# Patient Record
Sex: FEMALE | Race: AMERICAN INDIAN OR ALASKA NATIVE
[De-identification: names, ages, dates, MRNs, and addresses within clinical notes are randomized per-mention and may not be internally consistent; named-entity substitution may affect disease eponyms.]

---

## 2019-10-17 ENCOUNTER — HOSPITAL ENCOUNTER (OUTPATIENT)
Dept: HOSPITAL 5 - TRG | Age: 29
Discharge: HOME | End: 2019-10-17
Attending: OBSTETRICS & GYNECOLOGY
Payer: COMMERCIAL

## 2019-10-17 VITALS — DIASTOLIC BLOOD PRESSURE: 69 MMHG | SYSTOLIC BLOOD PRESSURE: 116 MMHG

## 2019-10-17 DIAGNOSIS — O47.02: ICD-10-CM

## 2019-10-17 DIAGNOSIS — Z3A.24: ICD-10-CM

## 2019-10-17 DIAGNOSIS — O26.892: Primary | ICD-10-CM

## 2019-10-17 DIAGNOSIS — R25.2: ICD-10-CM

## 2019-10-17 LAB
ALBUMIN SERPL-MCNC: 3.9 G/DL (ref 3.9–5)
ALT SERPL-CCNC: 14 UNITS/L (ref 7–56)
BACTERIA #/AREA URNS HPF: (no result) /HPF
BILIRUB UR QL STRIP: (no result)
BLOOD UR QL VISUAL: (no result)
BUN SERPL-MCNC: 7 MG/DL (ref 7–17)
BUN/CREAT SERPL: 12 %
CALCIUM SERPL-MCNC: 8.8 MG/DL (ref 8.4–10.2)
HCT VFR BLD CALC: 32.7 % (ref 30.3–42.9)
HEMOLYSIS INDEX: 7
HGB BLD-MCNC: 11.3 GM/DL (ref 10.1–14.3)
MCHC RBC AUTO-ENTMCNC: 35 % (ref 30–34)
MCV RBC AUTO: 90 FL (ref 79–97)
MUCOUS THREADS #/AREA URNS HPF: (no result) /HPF
PH UR STRIP: 7 [PH] (ref 5–7)
PLATELET # BLD: 203 K/MM3 (ref 140–440)
PROT UR STRIP-MCNC: (no result) MG/DL
RBC # BLD AUTO: 3.62 M/MM3 (ref 3.65–5.03)
RBC #/AREA URNS HPF: 2 /HPF (ref 0–6)
UROBILINOGEN UR-MCNC: < 2 MG/DL (ref ?–2)
WBC #/AREA URNS HPF: 1 /HPF (ref 0–6)

## 2019-10-17 PROCEDURE — 80053 COMPREHEN METABOLIC PANEL: CPT

## 2019-10-17 PROCEDURE — 96360 HYDRATION IV INFUSION INIT: CPT

## 2019-10-17 PROCEDURE — 36415 COLL VENOUS BLD VENIPUNCTURE: CPT

## 2019-10-17 PROCEDURE — 76819 FETAL BIOPHYS PROFIL W/O NST: CPT

## 2019-10-17 PROCEDURE — 85027 COMPLETE CBC AUTOMATED: CPT

## 2019-10-17 PROCEDURE — 59025 FETAL NON-STRESS TEST: CPT

## 2019-10-17 PROCEDURE — 81001 URINALYSIS AUTO W/SCOPE: CPT

## 2019-10-17 PROCEDURE — 76815 OB US LIMITED FETUS(S): CPT

## 2019-10-17 NOTE — ULTRASOUND REPORT
ULTRASOUND FETAL BIOPHYSICAL PROFILE

ULTRASOUND OB LIMITED 



INDICATION:  BPP/CERVICAL LENGTH



TECHNIQUE: Transabdominal ultrasound imaging.



COMPARISON:  None



FINDINGS:



Fetal breathing movement = 2

Gross body movement = 2

Fetal tone = 2

Qualitative amniotic fluid volume = 2



Total biophysical score = a/8



Amniotic fluid index was not measured.

Presentation is cephalic. 

Fetal heart rate is 147 beats per minute.

The cervix measures 3 cm.



IMPRESSION:

Fetal biophysical profile equals 8/8.

The cervix measures 3 cm.



Signer Name: Umair Menezes Jr, MD 

Signed: 10/17/2019 2:11 PM

 Workstation Name: [id number]

## 2019-10-17 NOTE — ULTRASOUND REPORT
ULTRASOUND FETAL BIOPHYSICAL PROFILE

ULTRASOUND OB LIMITED 



INDICATION:  BPP/CERVICAL LENGTH



TECHNIQUE: Transabdominal ultrasound imaging.



COMPARISON:  None



FINDINGS:



Fetal breathing movement = 2

Gross body movement = 2

Fetal tone = 2

Qualitative amniotic fluid volume = 2



Total biophysical score = a/8



Amniotic fluid index was not measured.

Presentation is cephalic. 

Fetal heart rate is 147 beats per minute.

The cervix measures 3 cm.



IMPRESSION:

Fetal biophysical profile equals 8/8.

The cervix measures 3 cm.



Signer Name: Umair Menezes Jr, MD 

Signed: 10/17/2019 2:11 PM

 Workstation Name: [id number]

## 2019-10-31 ENCOUNTER — HOSPITAL ENCOUNTER (OUTPATIENT)
Dept: HOSPITAL 5 - TRG | Age: 29
Discharge: HOME | End: 2019-10-31
Attending: OBSTETRICS & GYNECOLOGY
Payer: COMMERCIAL

## 2019-10-31 VITALS — DIASTOLIC BLOOD PRESSURE: 59 MMHG | SYSTOLIC BLOOD PRESSURE: 99 MMHG

## 2019-10-31 DIAGNOSIS — R10.30: ICD-10-CM

## 2019-10-31 DIAGNOSIS — O99.352: ICD-10-CM

## 2019-10-31 DIAGNOSIS — G43.909: ICD-10-CM

## 2019-10-31 DIAGNOSIS — Z3A.26: ICD-10-CM

## 2019-10-31 DIAGNOSIS — O26.892: Primary | ICD-10-CM

## 2019-10-31 LAB
BACTERIA #/AREA URNS HPF: (no result) /HPF
BILIRUB UR QL STRIP: (no result)
BLOOD UR QL VISUAL: (no result)
MUCOUS THREADS #/AREA URNS HPF: (no result) /HPF
PH UR STRIP: 5 [PH] (ref 5–7)
PROT UR STRIP-MCNC: (no result) MG/DL
RBC #/AREA URNS HPF: 1 /HPF (ref 0–6)
UROBILINOGEN UR-MCNC: 2 MG/DL (ref ?–2)
WBC #/AREA URNS HPF: 3 /HPF (ref 0–6)

## 2019-10-31 PROCEDURE — 96360 HYDRATION IV INFUSION INIT: CPT

## 2019-10-31 PROCEDURE — 96361 HYDRATE IV INFUSION ADD-ON: CPT

## 2019-10-31 PROCEDURE — 81001 URINALYSIS AUTO W/SCOPE: CPT

## 2019-11-08 ENCOUNTER — HOSPITAL ENCOUNTER (OUTPATIENT)
Dept: HOSPITAL 5 - TRG | Age: 29
Discharge: HOME | End: 2019-11-08
Attending: OBSTETRICS & GYNECOLOGY
Payer: COMMERCIAL

## 2019-11-08 VITALS — DIASTOLIC BLOOD PRESSURE: 76 MMHG | SYSTOLIC BLOOD PRESSURE: 121 MMHG

## 2019-11-08 DIAGNOSIS — O26.893: ICD-10-CM

## 2019-11-08 DIAGNOSIS — Z3A.28: ICD-10-CM

## 2019-11-08 LAB
BACTERIA #/AREA URNS HPF: (no result) /HPF
BILIRUB UR QL STRIP: (no result)
BLOOD UR QL VISUAL: (no result)
PH UR STRIP: 7 [PH] (ref 5–7)
PROT UR STRIP-MCNC: (no result) MG/DL
RBC #/AREA URNS HPF: 1 /HPF (ref 0–6)
UROBILINOGEN UR-MCNC: < 2 MG/DL (ref ?–2)
WBC #/AREA URNS HPF: 2 /HPF (ref 0–6)

## 2019-11-08 PROCEDURE — 59025 FETAL NON-STRESS TEST: CPT

## 2019-11-08 PROCEDURE — 36415 COLL VENOUS BLD VENIPUNCTURE: CPT

## 2019-11-08 PROCEDURE — 81001 URINALYSIS AUTO W/SCOPE: CPT

## 2019-11-08 PROCEDURE — 82731 ASSAY OF FETAL FIBRONECTIN: CPT

## 2019-11-23 ENCOUNTER — HOSPITAL ENCOUNTER (OUTPATIENT)
Dept: HOSPITAL 5 - TRG | Age: 29
Discharge: HOME | End: 2019-11-23
Attending: OBSTETRICS & GYNECOLOGY
Payer: COMMERCIAL

## 2019-11-23 VITALS — SYSTOLIC BLOOD PRESSURE: 122 MMHG | DIASTOLIC BLOOD PRESSURE: 61 MMHG

## 2019-11-23 DIAGNOSIS — D64.9: ICD-10-CM

## 2019-11-23 DIAGNOSIS — Z3A.30: ICD-10-CM

## 2019-11-23 DIAGNOSIS — O99.013: ICD-10-CM

## 2019-11-23 PROCEDURE — 96372 THER/PROPH/DIAG INJ SC/IM: CPT

## 2019-11-23 PROCEDURE — 96360 HYDRATION IV INFUSION INIT: CPT

## 2019-11-23 RX ADMIN — TERBUTALINE SULFATE SCH MG: 1 INJECTION SUBCUTANEOUS at 18:40

## 2019-11-23 RX ADMIN — TERBUTALINE SULFATE SCH MG: 1 INJECTION SUBCUTANEOUS at 19:01

## 2019-12-04 ENCOUNTER — HOSPITAL ENCOUNTER (OUTPATIENT)
Dept: HOSPITAL 5 - TRG | Age: 29
Discharge: HOME | End: 2019-12-04
Attending: OBSTETRICS & GYNECOLOGY
Payer: COMMERCIAL

## 2019-12-04 VITALS — SYSTOLIC BLOOD PRESSURE: 107 MMHG | DIASTOLIC BLOOD PRESSURE: 63 MMHG

## 2019-12-04 DIAGNOSIS — O47.03: Primary | ICD-10-CM

## 2019-12-04 DIAGNOSIS — Z3A.37: ICD-10-CM

## 2019-12-04 LAB
ALT SERPL-CCNC: 9 UNITS/L (ref 7–56)
BACTERIA #/AREA URNS HPF: (no result) /HPF
BILIRUB UR QL STRIP: (no result)
BLOOD UR QL VISUAL: (no result)
HCT VFR BLD CALC: 27.6 % (ref 30.3–42.9)
HGB BLD-MCNC: 9.5 GM/DL (ref 10.1–14.3)
MCHC RBC AUTO-ENTMCNC: 34 % (ref 30–34)
MCV RBC AUTO: 89 FL (ref 79–97)
MUCOUS THREADS #/AREA URNS HPF: (no result) /HPF
PH UR STRIP: 7 [PH] (ref 5–7)
PLATELET # BLD: 142 K/MM3 (ref 140–440)
PROT UR STRIP-MCNC: (no result) MG/DL
RBC # BLD AUTO: 3.1 M/MM3 (ref 3.65–5.03)
RBC #/AREA URNS HPF: 1 /HPF (ref 0–6)
URATE SERPL-MCNC: 2.9 MG/DL (ref 3.5–7.6)
UROBILINOGEN UR-MCNC: < 2 MG/DL (ref ?–2)
WBC #/AREA URNS HPF: 2 /HPF (ref 0–6)

## 2019-12-04 PROCEDURE — 85027 COMPLETE CBC AUTOMATED: CPT

## 2019-12-04 PROCEDURE — 81001 URINALYSIS AUTO W/SCOPE: CPT

## 2019-12-04 PROCEDURE — 84460 ALANINE AMINO (ALT) (SGPT): CPT

## 2019-12-04 PROCEDURE — 84450 TRANSFERASE (AST) (SGOT): CPT

## 2019-12-04 PROCEDURE — 84550 ASSAY OF BLOOD/URIC ACID: CPT

## 2019-12-04 PROCEDURE — 83615 LACTATE (LD) (LDH) ENZYME: CPT

## 2019-12-04 PROCEDURE — 82565 ASSAY OF CREATININE: CPT

## 2019-12-04 PROCEDURE — 36415 COLL VENOUS BLD VENIPUNCTURE: CPT

## 2019-12-19 ENCOUNTER — HOSPITAL ENCOUNTER (OUTPATIENT)
Dept: HOSPITAL 5 - TRG | Age: 29
LOS: 1 days | Discharge: HOME | End: 2019-12-20
Attending: OBSTETRICS & GYNECOLOGY
Payer: COMMERCIAL

## 2019-12-19 DIAGNOSIS — Z3A.33: ICD-10-CM

## 2019-12-19 PROCEDURE — 59025 FETAL NON-STRESS TEST: CPT

## 2019-12-19 PROCEDURE — 81001 URINALYSIS AUTO W/SCOPE: CPT

## 2019-12-20 VITALS — SYSTOLIC BLOOD PRESSURE: 101 MMHG | DIASTOLIC BLOOD PRESSURE: 71 MMHG

## 2019-12-20 LAB
BACTERIA #/AREA URNS HPF: (no result) /HPF
BILIRUB UR QL STRIP: (no result)
BLOOD UR QL VISUAL: (no result)
MUCOUS THREADS #/AREA URNS HPF: (no result) /HPF
PH UR STRIP: 5 [PH] (ref 5–7)
RBC #/AREA URNS HPF: 1 /HPF (ref 0–6)
UROBILINOGEN UR-MCNC: 4 MG/DL (ref ?–2)
WBC #/AREA URNS HPF: 2 /HPF (ref 0–6)

## 2020-01-05 ENCOUNTER — HOSPITAL ENCOUNTER (OUTPATIENT)
Dept: HOSPITAL 5 - TRG | Age: 30
Discharge: HOME | End: 2020-01-05
Attending: OBSTETRICS & GYNECOLOGY
Payer: COMMERCIAL

## 2020-01-05 VITALS — DIASTOLIC BLOOD PRESSURE: 70 MMHG | SYSTOLIC BLOOD PRESSURE: 101 MMHG

## 2020-01-05 DIAGNOSIS — O47.03: Primary | ICD-10-CM

## 2020-01-05 DIAGNOSIS — Z3A.36: ICD-10-CM

## 2020-01-05 LAB
BACTERIA #/AREA URNS HPF: (no result) /HPF
BILIRUB UR QL STRIP: (no result)
BLOOD UR QL VISUAL: (no result)
MUCOUS THREADS #/AREA URNS HPF: (no result) /HPF
PH UR STRIP: 6 [PH] (ref 5–7)
PROT UR STRIP-MCNC: (no result) MG/DL
RBC #/AREA URNS HPF: 3 /HPF (ref 0–6)
UROBILINOGEN UR-MCNC: 2 MG/DL (ref ?–2)
WBC #/AREA URNS HPF: 9 /HPF (ref 0–6)

## 2020-01-05 PROCEDURE — 81001 URINALYSIS AUTO W/SCOPE: CPT

## 2020-01-05 PROCEDURE — 87086 URINE CULTURE/COLONY COUNT: CPT

## 2020-01-06 NOTE — PROGRESS NOTE
Assessment and Plan


A: Pregnancy at 36 1/7 weeks gestation. 


False labor; not in active labor at this time. 


P: Discussed with patient comfort measures, warning signs of late pregnancy, 

daily fetal movement counting, and signs of labor. Advised patient to follow up 

at [facility] tomorrow. 








Subjective





- Subjective


Date of service: 20


Principal diagnosis: Pregnancy at 36 1/7 weeks


Interval history: 


Triage note for evening of 2020


29 year old  presented to L&D triage to rule out labor. Patient reports 

feeling vaginal pressure for a few days; thinks baby has dropped. Patient 

reports active fetal movement. Denies leaking of water or vaginal bleeding. 

Reports occasional mild contraction but no regular contractions. Denies urinary 

symptoms. Denies constipation. Denies falls or abdominal trauma. Denies any 

abdominal pain. Receives prenatal care at [facility] and was seen this 

past week. Received [place] injections during this pregnancy due to history of 

 birth with a previous pregnancy. 





Patient reports: fetal movement normal, no loss of fluid, no vaginal bleeding





Objective





- Vital Signs


Vital Signs: 


                               Vital Signs - 12hr











  20





  21:20 22:28 22:49


 


Pulse Rate 86 88 93 H


 


Blood Pressure 95/64 102/68 101/70














- Exam


Abdomen: Present: normal appearance, soft.  Absent: distention, tenderness, 

guarding, rigidity


Uterus: Present: normal, fundal height above umbilicus (S=D).  Absent: 

tenderness


FHR: category 1


Uterine Contraction Monitor Mode: External


Cervical Dilatation: 1 (thick, posterior)


Fetal station: -1 


Uterine Contraction Pattern: Irregular


Uterine Contraction Intensity: Mild


Extremities: normal





- Labs


Labs: 


                                  Abnormal Labs











  20





  21:57


 


Urine WBC (Auto)  9.0 H








                         Laboratory Results - last 24 hr











  20





  21:57


 


Urine Color  Yellow


 


Urine Turbidity  Slightly-cloudy


 


Urine pH  6.0


 


Ur Specific Gravity  1.015


 


Urine Protein  <15 mg/dl


 


Urine Glucose (UA)  Neg


 


Urine Ketones  Neg


 


Urine Blood  Neg


 


Urine Nitrite  Neg


 


Urine Bilirubin  Neg


 


Urine Urobilinogen  2.0


 


Ur Leukocyte Esterase  Neg


 


Urine WBC (Auto)  9.0 H


 


Urine RBC (Auto)  3.0


 


U Epithel Cells (Auto)  4.0


 


Urine Bacteria (Auto)  2+


 


Urine Mucus  Few

## 2020-01-13 ENCOUNTER — HOSPITAL ENCOUNTER (OUTPATIENT)
Dept: HOSPITAL 5 - TRG | Age: 30
Discharge: HOME | End: 2020-01-13
Attending: OBSTETRICS & GYNECOLOGY
Payer: COMMERCIAL

## 2020-01-13 VITALS — SYSTOLIC BLOOD PRESSURE: 108 MMHG | DIASTOLIC BLOOD PRESSURE: 65 MMHG

## 2020-01-13 DIAGNOSIS — O47.1: Primary | ICD-10-CM

## 2020-01-13 DIAGNOSIS — Z3A.37: ICD-10-CM

## 2020-01-13 PROCEDURE — 59025 FETAL NON-STRESS TEST: CPT

## 2020-01-17 ENCOUNTER — HOSPITAL ENCOUNTER (OUTPATIENT)
Dept: HOSPITAL 5 - TRG | Age: 30
Discharge: HOME | End: 2020-01-17
Attending: OBSTETRICS & GYNECOLOGY
Payer: COMMERCIAL

## 2020-01-17 VITALS — DIASTOLIC BLOOD PRESSURE: 80 MMHG | SYSTOLIC BLOOD PRESSURE: 113 MMHG

## 2020-01-17 DIAGNOSIS — Z3A.37: ICD-10-CM

## 2020-01-17 PROCEDURE — 59025 FETAL NON-STRESS TEST: CPT

## 2020-01-20 ENCOUNTER — HOSPITAL ENCOUNTER (OUTPATIENT)
Dept: HOSPITAL 5 - TRG | Age: 30
Discharge: HOME | End: 2020-01-20
Attending: OBSTETRICS & GYNECOLOGY
Payer: COMMERCIAL

## 2020-01-20 VITALS — DIASTOLIC BLOOD PRESSURE: 67 MMHG | SYSTOLIC BLOOD PRESSURE: 111 MMHG

## 2020-01-20 DIAGNOSIS — O47.1: Primary | ICD-10-CM

## 2020-01-20 DIAGNOSIS — Z3A.38: ICD-10-CM

## 2020-01-20 LAB
BACTERIA #/AREA URNS HPF: (no result) /HPF
BILIRUB UR QL STRIP: (no result)
BLOOD UR QL VISUAL: (no result)
MUCOUS THREADS #/AREA URNS HPF: (no result) /HPF
PH UR STRIP: 6 [PH] (ref 5–7)
PROT UR STRIP-MCNC: (no result) MG/DL
RBC #/AREA URNS HPF: 1 /HPF (ref 0–6)
UROBILINOGEN UR-MCNC: 4 MG/DL (ref ?–2)
WBC #/AREA URNS HPF: 8 /HPF (ref 0–6)

## 2020-01-20 PROCEDURE — 81001 URINALYSIS AUTO W/SCOPE: CPT

## 2020-01-20 PROCEDURE — 59025 FETAL NON-STRESS TEST: CPT

## 2020-01-24 ENCOUNTER — HOSPITAL ENCOUNTER (OUTPATIENT)
Dept: HOSPITAL 5 - TRG | Age: 30
Discharge: HOME | End: 2020-01-24
Attending: OBSTETRICS & GYNECOLOGY
Payer: COMMERCIAL

## 2020-01-24 VITALS — DIASTOLIC BLOOD PRESSURE: 67 MMHG | SYSTOLIC BLOOD PRESSURE: 109 MMHG

## 2020-01-24 DIAGNOSIS — Z3A.38: ICD-10-CM

## 2020-01-24 DIAGNOSIS — O47.1: Primary | ICD-10-CM

## 2020-01-24 PROCEDURE — 59025 FETAL NON-STRESS TEST: CPT

## 2020-10-31 ENCOUNTER — HOSPITAL ENCOUNTER (EMERGENCY)
Dept: HOSPITAL 5 - ED | Age: 30
Discharge: HOME | End: 2020-10-31
Payer: COMMERCIAL

## 2020-10-31 VITALS — SYSTOLIC BLOOD PRESSURE: 114 MMHG | DIASTOLIC BLOOD PRESSURE: 95 MMHG

## 2020-10-31 DIAGNOSIS — T83.32XA: Primary | ICD-10-CM

## 2020-10-31 DIAGNOSIS — R11.2: ICD-10-CM
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
## 2020-10-31 DIAGNOSIS — Z79.899: ICD-10-CM

## 2020-10-31 DIAGNOSIS — Z79.2: ICD-10-CM

## 2020-10-31 DIAGNOSIS — K52.9: ICD-10-CM

## 2020-10-31 LAB
ALBUMIN SERPL-MCNC: 4.8 G/DL (ref 3.9–5)
ALT SERPL-CCNC: 12 UNITS/L (ref 7–56)
BAND NEUTROPHILS # (MANUAL): 0 K/MM3
BILIRUB UR QL STRIP: (no result)
BLOOD UR QL VISUAL: (no result)
BUN SERPL-MCNC: 12 MG/DL (ref 7–17)
BUN/CREAT SERPL: 15 %
CALCIUM SERPL-MCNC: 9.6 MG/DL (ref 8.4–10.2)
HCT VFR BLD CALC: 37.3 % (ref 30.3–42.9)
HEMOLYSIS INDEX: 5
HGB BLD-MCNC: 13.2 GM/DL (ref 10.1–14.3)
MCHC RBC AUTO-ENTMCNC: 35 % (ref 30–34)
MCV RBC AUTO: 89 FL (ref 79–97)
MUCOUS THREADS #/AREA URNS HPF: (no result) /HPF
MYELOCYTES # (MANUAL): 0 K/MM3
PH UR STRIP: 7 [PH] (ref 5–7)
PLATELET # BLD: 209 K/MM3 (ref 140–440)
PROMYELOCYTES # (MANUAL): 0 K/MM3
RBC # BLD AUTO: 4.2 M/MM3 (ref 3.65–5.03)
RBC #/AREA URNS HPF: 3 /HPF (ref 0–6)
TOTAL CELLS COUNTED BLD: 100
UROBILINOGEN UR-MCNC: 4 MG/DL (ref ?–2)
WBC #/AREA URNS HPF: 3 /HPF (ref 0–6)

## 2020-10-31 PROCEDURE — 96361 HYDRATE IV INFUSION ADD-ON: CPT

## 2020-10-31 PROCEDURE — 83690 ASSAY OF LIPASE: CPT

## 2020-10-31 PROCEDURE — 96372 THER/PROPH/DIAG INJ SC/IM: CPT

## 2020-10-31 PROCEDURE — 96374 THER/PROPH/DIAG INJ IV PUSH: CPT

## 2020-10-31 PROCEDURE — 99284 EMERGENCY DEPT VISIT MOD MDM: CPT

## 2020-10-31 PROCEDURE — 84703 CHORIONIC GONADOTROPIN ASSAY: CPT

## 2020-10-31 PROCEDURE — 74177 CT ABD & PELVIS W/CONTRAST: CPT

## 2020-10-31 PROCEDURE — 81001 URINALYSIS AUTO W/SCOPE: CPT

## 2020-10-31 PROCEDURE — 96375 TX/PRO/DX INJ NEW DRUG ADDON: CPT

## 2020-10-31 PROCEDURE — 36415 COLL VENOUS BLD VENIPUNCTURE: CPT

## 2020-10-31 PROCEDURE — 85025 COMPLETE CBC W/AUTO DIFF WBC: CPT

## 2020-10-31 PROCEDURE — 80053 COMPREHEN METABOLIC PANEL: CPT

## 2020-10-31 PROCEDURE — 85007 BL SMEAR W/DIFF WBC COUNT: CPT

## 2020-10-31 NOTE — EMERGENCY DEPARTMENT REPORT
ED N/V/D HPI





- General


Chief complaint: Abdominal Pain


Stated complaint: ABDOMINAL PAIN


Time Seen by Provider: 10/31/20 12:08


Source: patient


Mode of arrival: Wheelchair


Limitations: No Limitations





- History of Present Illness


Initial comments: 





Patient is a 30-year-old female presents emergency room with complaints of 

nausea, vomiting, diarrhea that began yesterday.  She has associated generalized

abdominal cramping.  She denies any fever, hematochezia, hematemesis, melena, 

dysuria, abnormal vaginal discharge, vaginal irritation.  She states last thing 

she ate was some pasta.  She states that no one else got sick.  She denies any 

sick contacts or recent travel.  No past medical history.  No allergies to 

medications.  She states that she has a IUD for birth control.





- Related Data


                                  Previous Rx's











 Medication  Instructions  Recorded  Last Taken  Type


 


Ferrous Sulfate [Feosol 325 MG tab] 325 mg PO BID 30 Days #60 tablet 01/28/20 

Unknown Rx


 


Ciprofloxacin HCl [Ciprofloxacin 500 mg PO BID 7 Days #14 tab 10/31/20 Unknown 

Rx





TAB]    


 


Promethazine [Phenergan] 25 mg PO Q8HR PRN #10 tab 10/31/20 Unknown Rx


 


metroNIDAZOLE [Flagyl] 500 mg PO BID 7 Days #14 tab 10/31/20 Unknown Rx











                                    Allergies











Allergy/AdvReac Type Severity Reaction Status Date / Time


 


No Known Allergies Allergy   Verified 10/31/19 05:53














ED Review of Systems


ROS: 


Stated complaint: ABDOMINAL PAIN


Other details as noted in HPI





Comment: All other systems reviewed and negative





ED Past Medical Hx





- Past Medical History


Previous Medical History?: No


Hx Hypertension: No


Hx Heart Attack/AMI: No


Hx Congestive Heart Failure: No


Hx Diabetes: No


Hx Deep Vein Thrombosis: No


Hx Liver Disease: No


Hx Renal Disease: No


Hx Sickle Cell Disease: No


Hx Seizures: No


Hx Asthma: No


Hx COPD: No


Hx HIV: No





- Surgical History


Past Surgical History?: No


Hx Pacemaker: No


Hx Internal Defibrillator: No





- Social History


Smoking Status: Never Smoker





- Medications


Home Medications: 


                                Home Medications











 Medication  Instructions  Recorded  Confirmed  Last Taken  Type


 


Ferrous Sulfate [Feosol 325 MG tab] 325 mg PO BID 30 Days #60 tablet 01/28/20  

Unknown Rx


 


Ciprofloxacin HCl [Ciprofloxacin 500 mg PO BID 7 Days #14 tab 10/31/20  Unknown 

Rx





TAB]     


 


Promethazine [Phenergan] 25 mg PO Q8HR PRN #10 tab 10/31/20  Unknown Rx


 


metroNIDAZOLE [Flagyl] 500 mg PO BID 7 Days #14 tab 10/31/20  Unknown Rx














ED Physical Exam





- General


Limitations: No Limitations


General appearance: alert, in no apparent distress





- Head


Head exam: Present: atraumatic, normocephalic





- Eye


Eye exam: Present: normal appearance





- ENT


ENT exam: Present: mucous membranes moist





- Respiratory


Respiratory exam: Present: normal lung sounds bilaterally.  Absent: respiratory 

distress, wheezes, rales, rhonchi, stridor, chest wall tenderness, accessory 

muscle use, decreased breath sounds, prolonged expiratory





- Cardiovascular


Cardiovascular Exam: Present: regular rate, normal rhythm, normal heart sounds. 

 Absent: systolic murmur, diastolic murmur, rubs, gallop





- GI/Abdominal


GI/Abdominal exam: Present: soft, normal bowel sounds.  Absent: distended, 

tenderness, guarding, rebound, rigid





- Neurological Exam


Neurological exam: Present: alert, oriented X3





- Psychiatric


Psychiatric exam: Present: normal affect, normal mood





- Skin


Skin exam: Present: warm, dry, intact





ED Course


                                   Vital Signs











  10/31/20 10/31/20 10/31/20





  11:26 15:17 15:49


 


Temperature 97.7 F  


 


Pulse Rate 66  64


 


Respiratory 16 18 





Rate   


 


Blood Pressure   114/95


 


Blood Pressure 137/95  





[Right]   


 


O2 Sat by Pulse 99 99 100





Oximetry   














ED Medical Decision Making





- Lab Data


Result diagrams: 


                                 10/31/20 12:23





                                 10/31/20 12:23








                                   Lab Results











  10/31/20 10/31/20 10/31/20 Range/Units





  12:23 12:23 12:23 


 


WBC  10.5    (4.5-11.0)  K/mm3


 


RBC  4.20    (3.65-5.03)  M/mm3


 


Hgb  13.2    (10.1-14.3)  gm/dl


 


Hct  37.3    (30.3-42.9)  %


 


MCV  89    (79-97)  fl


 


MCH  32    (28-32)  pg


 


MCHC  35 H    (30-34)  %


 


RDW  14.6    (13.2-15.2)  %


 


Plt Count  209    (140-440)  K/mm3


 


Add Manual Diff  Complete    


 


Total Counted  100    


 


Seg Neutrophils %  Np    


 


Seg Neuts % (Manual)  90.0 H    (40.0-70.0)  %


 


Band Neutrophils %  0    %


 


Lymphocytes % (Manual)  5.0 L    (13.4-35.0)  %


 


Reactive Lymphs % (Man)  0    %


 


Monocytes % (Manual)  5.0    (0.0-7.3)  %


 


Eosinophils % (Manual)  0    (0.0-4.3)  %


 


Basophils % (Manual)  0    (0.0-1.8)  %


 


Metamyelocytes %  0    %


 


Myelocytes %  0    %


 


Promyelocytes %  0    %


 


Blast Cells %  0    %


 


Nucleated RBC %  Not Reportable    


 


Seg Neutrophils # Man  9.5 H    (1.8-7.7)  K/mm3


 


Band Neutrophils #  0.0    K/mm3


 


Lymphocytes # (Manual)  0.5 L    (1.2-5.4)  K/mm3


 


Abs React Lymphs (Man)  0.0    K/mm3


 


Monocytes # (Manual)  0.5    (0.0-0.8)  K/mm3


 


Eosinophils # (Manual)  0.0    (0.0-0.4)  K/mm3


 


Basophils # (Manual)  0.0    (0.0-0.1)  K/mm3


 


Metamyelocytes #  0.0    K/mm3


 


Myelocytes #  0.0    K/mm3


 


Promyelocytes #  0.0    K/mm3


 


Blast Cells #  0.0    K/mm3


 


WBC Morphology  Not Reportable    


 


Hypersegmented Neuts  Not Reportable    


 


Hyposegmented Neuts  Not Reportable    


 


Hypogranular Neuts  Not Reportable    


 


Smudge Cells  Not Reportable    


 


Toxic Granulation  Not Reportable    


 


Toxic Vacuolation  Not Reportable    


 


Dohle Bodies  Not Reportable    


 


Pelger-Huet Anomaly  Not Reportable    


 


Raul Rods  Not Reportable    


 


Platelet Estimate  Not Reportable    


 


Clumped Platelets  Not Reportable    


 


Plt Clumps, EDTA  Not Reportable    


 


Large Platelets  Not Reportable    


 


Giant Platelets  Not Reportable    


 


Platelet Satelliting  Not Reportable    


 


Plt Morphology Comment  Not Reportable    


 


RBC Morphology  Normal    


 


Dimorphic RBCs  Not Reportable    


 


Polychromasia  Not Reportable    


 


Hypochromasia  Not Reportable    


 


Poikilocytosis  Not Reportable    


 


Anisocytosis  Not Reportable    


 


Microcytosis  Not Reportable    


 


Macrocytosis  Not Reportable    


 


Spherocytes  Not Reportable    


 


Pappenheimer Bodies  Not Reportable    


 


Sickle Cells  Not Reportable    


 


Target Cells  Not Reportable    


 


Tear Drop Cells  Not Reportable    


 


Ovalocytes  Not Reportable    


 


Helmet Cells  Not Reportable    


 


Arango-Norrie Bodies  Not Reportable    


 


Cabot Rings  Not Reportable    


 


[place] Cells  Not Reportable    


 


Bite Cells  Not Reportable    


 


Crenated Cell  Not Reportable    


 


Elliptocytes  Not Reportable    


 


Acanthocytes (Spur)  Not Reportable    


 


Rouleaux  Not Reportable    


 


Hemoglobin C Crystals  Not Reportable    


 


Schistocytes  Not Reportable    


 


Malaria parasites  Not Reportable    


 


Kali Bodies  Not Reportable    


 


Hem Pathologist Commnt  No    


 


Sodium   141   (137-145)  mmol/L


 


Potassium   3.2 L   (3.6-5.0)  mmol/L


 


Chloride   101.9   ()  mmol/L


 


Carbon Dioxide   20 L   (22-30)  mmol/L


 


Anion Gap   22   mmol/L


 


BUN   12   (7-17)  mg/dL


 


Creatinine   0.8   (0.6-1.2)  mg/dL


 


Estimated GFR   > 60   ml/min


 


BUN/Creatinine Ratio   15   %


 


Glucose   138 H   ()  mg/dL


 


Calcium   9.6   (8.4-10.2)  mg/dL


 


Total Bilirubin   0.40   (0.1-1.2)  mg/dL


 


AST   13   (5-40)  units/L


 


ALT   12   (7-56)  units/L


 


Alkaline Phosphatase   77   ()  units/L


 


Total Protein   8.0   (6.3-8.2)  g/dL


 


Albumin   4.8   (3.9-5)  g/dL


 


Albumin/Globulin Ratio   1.5   %


 


Lipase   15   (13-60)  units/L


 


HCG, Qual    Negative  (Negative)  


 


Urine Color     (Yellow)  


 


Urine Turbidity     (Clear)  


 


Urine pH     (5.0-7.0)  


 


Ur Specific Gravity     (1.003-1.030)  


 


Urine Protein     (Negative)  mg/dL


 


Urine Glucose (UA)     (Negative)  mg/dL


 


Urine Ketones     (Negative)  mg/dL


 


Urine Blood     (Negative)  


 


Urine Nitrite     (Negative)  


 


Urine Bilirubin     (Negative)  


 


Urine Urobilinogen     (<2.0)  mg/dL


 


Ur Leukocyte Esterase     (Negative)  


 


Urine WBC (Auto)     (0.0-6.0)  /HPF


 


Urine RBC (Auto)     (0.0-6.0)  /HPF


 


U Epithel Cells (Auto)     (0-13.0)  /HPF


 


Urine Mucus     /HPF














  10/31/20 10/31/20 Range/Units





  12:23 Unknown 


 


WBC    (4.5-11.0)  K/mm3


 


RBC    (3.65-5.03)  M/mm3


 


Hgb    (10.1-14.3)  gm/dl


 


Hct    (30.3-42.9)  %


 


MCV    (79-97)  fl


 


MCH    (28-32)  pg


 


MCHC    (30-34)  %


 


RDW    (13.2-15.2)  %


 


Plt Count    (140-440)  K/mm3


 


Add Manual Diff    


 


Total Counted    


 


Seg Neutrophils %    


 


Seg Neuts % (Manual)    (40.0-70.0)  %


 


Band Neutrophils %    %


 


Lymphocytes % (Manual)    (13.4-35.0)  %


 


Reactive Lymphs % (Man)    %


 


Monocytes % (Manual)    (0.0-7.3)  %


 


Eosinophils % (Manual)    (0.0-4.3)  %


 


Basophils % (Manual)    (0.0-1.8)  %


 


Metamyelocytes %    %


 


Myelocytes %    %


 


Promyelocytes %    %


 


Blast Cells %    %


 


Nucleated RBC %    


 


Seg Neutrophils # Man    (1.8-7.7)  K/mm3


 


Band Neutrophils #    K/mm3


 


Lymphocytes # (Manual)    (1.2-5.4)  K/mm3


 


Abs React Lymphs (Man)    K/mm3


 


Monocytes # (Manual)    (0.0-0.8)  K/mm3


 


Eosinophils # (Manual)    (0.0-0.4)  K/mm3


 


Basophils # (Manual)    (0.0-0.1)  K/mm3


 


Metamyelocytes #    K/mm3


 


Myelocytes #    K/mm3


 


Promyelocytes #    K/mm3


 


Blast Cells #    K/mm3


 


WBC Morphology  TNR   


 


Hypersegmented Neuts    


 


Hyposegmented Neuts    


 


Hypogranular Neuts    


 


Smudge Cells    


 


Toxic Granulation    


 


Toxic Vacuolation    


 


Dohle Bodies    


 


Pelger-Huet Anomaly    


 


Raul Rods    


 


Platelet Estimate    


 


Clumped Platelets    


 


Plt Clumps, EDTA    


 


Large Platelets    


 


Giant Platelets    


 


Platelet Satelliting    


 


Plt Morphology Comment    


 


RBC Morphology    


 


Dimorphic RBCs    


 


Polychromasia    


 


Hypochromasia    


 


Poikilocytosis    


 


Anisocytosis    


 


Microcytosis    


 


Macrocytosis    


 


Spherocytes    


 


Pappenheimer Bodies    


 


Sickle Cells    


 


Target Cells    


 


Tear Drop Cells    


 


Ovalocytes    


 


Helmet Cells    


 


Arango-Norrie Bodies    


 


Cabot Rings    


 


[place] Cells    


 


Bite Cells    


 


Crenated Cell    


 


Elliptocytes    


 


Acanthocytes (Spur)    


 


Rouleaux    


 


Hemoglobin C Crystals    


 


Schistocytes    


 


Malaria parasites    


 


Kali Bodies    


 


Hem Pathologist Commnt    


 


Sodium    (137-145)  mmol/L


 


Potassium    (3.6-5.0)  mmol/L


 


Chloride    ()  mmol/L


 


Carbon Dioxide    (22-30)  mmol/L


 


Anion Gap    mmol/L


 


BUN    (7-17)  mg/dL


 


Creatinine    (0.6-1.2)  mg/dL


 


Estimated GFR    ml/min


 


BUN/Creatinine Ratio    %


 


Glucose    ()  mg/dL


 


Calcium    (8.4-10.2)  mg/dL


 


Total Bilirubin    (0.1-1.2)  mg/dL


 


AST    (5-40)  units/L


 


ALT    (7-56)  units/L


 


Alkaline Phosphatase    ()  units/L


 


Total Protein    (6.3-8.2)  g/dL


 


Albumin    (3.9-5)  g/dL


 


Albumin/Globulin Ratio    %


 


Lipase    (13-60)  units/L


 


HCG, Qual    (Negative)  


 


Urine Color   Yellow  (Yellow)  


 


Urine Turbidity   Clear  (Clear)  


 


Urine pH   7.0  (5.0-7.0)  


 


Ur Specific Gravity   1.025  (1.003-1.030)  


 


Urine Protein   100 mg/dl  (Negative)  mg/dL


 


Urine Glucose (UA)   Neg  (Negative)  mg/dL


 


Urine Ketones   80  (Negative)  mg/dL


 


Urine Blood   Neg  (Negative)  


 


Urine Nitrite   Neg  (Negative)  


 


Urine Bilirubin   Neg  (Negative)  


 


Urine Urobilinogen   4.0  (<2.0)  mg/dL


 


Ur Leukocyte Esterase   Neg  (Negative)  


 


Urine WBC (Auto)   3.0  (0.0-6.0)  /HPF


 


Urine RBC (Auto)   3.0  (0.0-6.0)  /HPF


 


U Epithel Cells (Auto)   13.0  (0-13.0)  /HPF


 


Urine Mucus   3+  /HPF














- Radiology Data


Radiology results: report reviewed





Ordering Physician: GLENN DUARTE 


 Date of Service: 10/31/20 


 Procedure(s): CT abdomen pelvis w con 


 Accession Number(s): [id number] 





 cc: GLENN DUARTE 








 CT abdomen pelvis w con 





INDICATION / CLINICAL INFORMATION: 


Generalized abdominal pain with nausea vomiting and diarrhea for 2 days. 





TECHNIQUE: 


Axial CT imaging of abdomen and pelvis was obtained with IV contrast. Coronal 

and sagittal 


 reformatted imaging obtained and reviewed. All CT scans at this location are 

performed using CT 


 dose reduction for ALARA by means of automated exposure control. 





COMPARISON: 


None available. 





FINDINGS: 


CT abdomen with IV contrast demonstrates normal appearance of the liver, spleen,

 pancreas, kidneys,


 and adrenal glands. Small area of focal fatty infiltration is seen in the left 

lobe the liver 


 adjacent to the falciform ligament. No renal mass or hydronephrosis identified.

 Gallbladder is 


 grossly unremarkable. No biliary dilatation. 





CT pelvis with contrast demonstrates IUD within the uterus. IUD is extremely 

superficially located 


 within the uterus and may be within the myometrium. No pelvic mass, free fluid,

 or focal inflam


 matory changes noted. A normal appendix is present. Trace amount of free fluid 

is present in the 


 posterior cul-de-sac. 





There is mild inflammatory change throughout the majority of the colon 

suggesting very mild 


 colitis. 





Visualized lung bases are clear. 





No significant osseous abnormality noted. 





IMPRESSION: 


1. Probable mild colitis. Please clinically correlate with patient's symptoms. 


2. Incidentally noted is potential malposition of the IUD. IUD is markedly 

superficially located 


 within the uterine fundus region and appears to be at least partially, within 

the myometrium rather 


 than the endometrium. I do not see evidence of perforation, however. 





Signer Name: Sharon Lorenzo MD 


Signed: 10/31/2020 3:10 PM 


Workstation Name: [id number] 








 Transcribed By:  


 Dictated By: Sharon Lorenzo MD 


 Electronically Authenticated By: Sharon Lorenzo MD 


 Signed Date/Time: 10/31/20 1510 











 DD/DT: 10/31/20 1505 


 TD/TT: 





- Medical Decision Making





Patient is a 30-year-old female presents emergency room with complaints of 

nausea, vomiting, diarrhea that began yesterday.  She has associated generalized

 abdominal cramping.  She denies any fever, hematochezia, hematemesis, melena, 

dysuria, abnormal vaginal discharge, vaginal irritation.  She states last thing 

she ate was some pasta.  She states that no one else got sick.  She denies any 

sick contacts or recent travel.  No past medical history.  No allergies to 

medications.  She states that she has a IUD for birth control.  Vitals are 

normal.  On exam no abdominal tenderness palpation, no guarding, no rebound, no 

rigidity, normal bowel sounds.  Labs are normal.  UA is within normal limits.  

Patient given 1 L fluids, Zofran, Bentyl.  Patient continued to have discomfort 

and vomiting.  Patient given Benadryl and Reglan and CT abdomen pelvis ordered 

to rule out intra-abdominal pathology.  CT abdomen pelvis with IV contrast shows

 1. Probable mild colitis. Please clinically correlate with patient's symptoms. 

2. Incidentally noted is potential malposition of the IUD. IUD is markedly 

superficially located  within the uterine fundus region and appears to be at 

least partially, within the myometrium rather  than the endometrium. I do not 

see evidence of perforation, however.  After medications symptoms have 

completely resolved and patient feels much better and ready to go home.  She had

 no further episodes of vomiting, she is able to tolerate p.o. intake.  

Discussed all results with patient and answered questions.  Patient will be 

treated for her mild colitis.  Discussed the malposition of her IUD with 

patient.  Patient given prescription for Cipro, Flagyl, Phenergan. advised pt 

Please take medication as prescribed.  Increase your water intake.  Eat a bland 

liquid diet and slowly advance your diet as tolerated.  Follow up with your 

primary care doctor.  Follow-up with OB/GYN regarding the potential malposition 

of your IUD.  Return to emergency room for any new or worsening symptoms.





- Differential Diagnosis


Gastroenteritis, colitis, cholecystitis, UTI, diverticulitis, appendicitis


Critical care attestation.: 


If time is entered above; I have spent that time in minutes in the direct care 

of this critically ill patient, excluding procedure time.








ED Disposition


Clinical Impression: 


 Nausea vomiting and diarrhea, Abdominal cramping, Colitis





Malpositioned IUD


Qualifiers:


 Encounter type: initial encounter Qualified Code(s): T83.32XA - Displacement of

 intrauterine contraceptive device, initial encounter





Disposition: DC-01 TO HOME OR SELFCARE


Is pt being admited?: No


Does the pt Need Aspirin: No


Condition: Stable


Instructions:  Gastroenteritis (ED), Infectious Colitis (ED)


Additional Instructions: 


Please take medication as prescribed.  Increase your water intake.  Eat a bland 

liquid diet and slowly advance your diet as tolerated.  Follow up with your 

primary care doctor.  Follow-up with OB/GYN regarding the potential malposition 

of your IUD.  Return to emergency room for any new or worsening symptoms.


Prescriptions: 


Ciprofloxacin HCl [Ciprofloxacin TAB] 500 mg PO BID 7 Days #14 tab


metroNIDAZOLE [Flagyl] 500 mg PO BID 7 Days #14 tab


Promethazine [Phenergan] 25 mg PO Q8HR PRN #10 tab


 PRN Reason: Nausea And Vomiting


Referrals: 


TUTU PAL MD [Primary Care Provider] - 2-3 Days


your, ob/gyn [Other] - 2-3 Days


Time of Disposition: 15:38


Print Language: ENGLISH

## 2020-10-31 NOTE — CAT SCAN REPORT
CT abdomen pelvis w con 



INDICATION / CLINICAL INFORMATION:

Generalized abdominal pain with nausea vomiting and diarrhea for 2 days.



TECHNIQUE:

Axial CT imaging of abdomen and pelvis was obtained with IV contrast. Coronal and sagittal reformatte
d imaging obtained and reviewed.   All CT scans at this location are performed using CT dose reductio
n for ALARA by means of automated exposure control. 



COMPARISON:

None available.



FINDINGS:

CT abdomen with IV contrast demonstrates normal appearance of the liver, spleen, pancreas, kidneys, a
nd adrenal glands. Small area of focal fatty infiltration is seen in the left lobe the liver adjacent
 to the falciform ligament. No renal mass or hydronephrosis identified. Gallbladder is grossly unrema
rkable. No biliary dilatation.



CT pelvis with contrast demonstrates IUD within the uterus. IUD is extremely superficially located wi
thin the uterus and may be within the myometrium. No pelvic mass, free fluid, or focal inflammatory c
hanges noted. A normal appendix is present. Trace amount of free fluid is present in the posterior cu
l-de-sac.



There is mild inflammatory change throughout the majority of the colon suggesting very mild colitis.



Visualized lung bases are clear.



No significant osseous abnormality noted.



IMPRESSION:

1. Probable mild colitis. Please clinically correlate with patient's symptoms.

2. Incidentally noted is potential malposition of the IUD. IUD is markedly superficially located with
in the uterine fundus region and appears to be at least partially, within the myometrium rather than 
the endometrium. I do not see evidence of perforation, however.



Signer Name: Sharon Lorenzo MD 

Signed: 10/31/2020 3:10 PM

Workstation Name: [id number]

## 2020-11-10 ENCOUNTER — HOSPITAL ENCOUNTER (OUTPATIENT)
Dept: HOSPITAL 5 - OR | Age: 30
Discharge: HOME | End: 2020-11-10
Attending: OBSTETRICS & GYNECOLOGY
Payer: COMMERCIAL

## 2020-11-10 VITALS — DIASTOLIC BLOOD PRESSURE: 83 MMHG | SYSTOLIC BLOOD PRESSURE: 127 MMHG

## 2020-11-10 DIAGNOSIS — G43.909: ICD-10-CM

## 2020-11-10 DIAGNOSIS — K21.9: ICD-10-CM

## 2020-11-10 DIAGNOSIS — Z83.3: ICD-10-CM

## 2020-11-10 DIAGNOSIS — Z82.49: ICD-10-CM
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
## 2020-11-10 DIAGNOSIS — Z98.890: ICD-10-CM

## 2020-11-10 DIAGNOSIS — T83.32XA: ICD-10-CM
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
## 2020-11-10 DIAGNOSIS — Z30.432: Primary | ICD-10-CM

## 2020-11-10 DIAGNOSIS — D64.9: ICD-10-CM

## 2020-11-10 PROCEDURE — 88300 SURGICAL PATH GROSS: CPT

## 2020-11-10 PROCEDURE — A4217 STERILE WATER/SALINE, 500 ML: HCPCS

## 2020-11-10 PROCEDURE — 88302 TISSUE EXAM BY PATHOLOGIST: CPT

## 2020-11-10 PROCEDURE — 58301 REMOVE INTRAUTERINE DEVICE: CPT

## 2020-11-10 PROCEDURE — 81025 URINE PREGNANCY TEST: CPT

## 2020-11-10 NOTE — ANESTHESIA CONSULTATION
Anesthesia Consult and Med Hx


Date of service: 11/10/20





- Airway


Anesthetic Teeth Evaluation: Chipped


ROM Head & Neck: Adequate


Mental/Hyoid Distance: Adequate


Mallampati Class: Class II


Intubation Access Assessment: Good





- Pre-Operative Health Status


ASA Pre-Surgery Classification: ASA2


Proposed Anesthetic Plan: General





- Pulmonary


Hx Smoking: No


Hx Asthma: No


Hx Respiratory Symptoms: No


SOB: No


COPD: No


Hx Pneumonia: No


Hx Sleep Apnea: No





- Cardiovascular System


Hx Hypertension: No


Hx Coronary Artery Disease: No


Hx Heart Attack/AMI: No


Hx Angina: No


Hx Percutaneous Transluminal Coronary Angioplasty (PTCA): No


Hx Cardia Arrhythmia: No


Hx Pacemaker: No


Hx Internal Defibrillator: No


Hx Valvular Heart Disease: No


Hx Heart Murmur: No


Hx Peripheral Vascular Disease: No





- Central Nervous System


Hx Neuromuscular Disorder: Yes (Migraines)


Hx Seizures: No


CVA: No


Hx Back Pain: No


Hx Psychiatric Problems: No





- Gastrointestinal


Hx Ulcer: No


Hx Gastroesophageal Reflux Disease: Yes





- Endocrine


Hx Renal Disease: No


Hx End Stage Renal Disease: No


Hx Cirrhosis: No


Hx Liver Disease: No


Hx Insulin Dependent Diabetes: No


Hx Non-Insulin Dependent Diabetes: No


Hx Thyroid Disease: No


Hx Hypothyroidism: No


Hx Hyperthyroidism: No





- Hematic


Hx Anemia: Yes


Hx Sickle Cell Disease: No





- Other Systems


Hx Alcohol Use: No


Hx Substance Use: No


Hx Cancer: No


Hx Obesity: No

## 2020-11-10 NOTE — SHORT STAY SUMMARY
Short Stay Documentation


Date of service: 11/10/20


Narrative H&P: 





Patient had removal of her IUD under anesthesia.  No hysteroscopy was required. 

Please see H&P and operative report for details.





- History


H&P: dictated


Social history: no significant social history





- Allergies and Medications


Current Medications: 


                                    Allergies





No Known Allergies Allergy (Verified 11/09/20 10:02)


   





                                Home Medications











 Medication  Instructions  Recorded  Confirmed  Last Taken  Type


 


No Known Home Medications [No  11/09/20 11/10/20 Unknown History





Reported Home Medications]     








Active Medications





Hydromorphone HCl (Dilaudid)  0.25 mg IV Q10MIN PRN


   PRN Reason: Pain, Moderate (4-6)


   Stop: 11/10/20 23:00


Hydromorphone HCl (Dilaudid)  0.5 mg IV Q10MIN PRN


   PRN Reason: Pain , Severe (7-10)


   Stop: 11/10/20 23:00


Lactated Ringer's (Lactated Ringers)  1,000 mls @ 125 mls/hr IV AS DIRECT RAYMOND


   Last Admin: 11/10/20 12:05 Dose:  125 mls/hr


   Documented by: 


Midazolam HCl (Versed)  2 mg IV PREOP NR


   Stop: 11/10/20 23:59


   Last Admin: 11/10/20 12:10 Dose:  2 mg


   Documented by: 


Ondansetron HCl (Zofran)  4 mg IV ONCE PRN


   PRN Reason: Nausea And Vomiting


   Stop: 11/10/20 23:00











- Disposition


Condition at discharge: Stable


Disposition: DC-01 TO HOME OR SELFCARE





- Discharge Diagnoses


(1) Malpositioned IUD


Status: Acute   





Short Stay Discharge Plan


Follow up with: 


SOTERO GARCIA MD [Staff Physician] - 12/10/20

## 2020-11-10 NOTE — POST OPERATIVE NOTE
Date of procedure: 11/10/20


Pre-op diagnosis: retained IUD


Post-op diagnosis: same


Findings: 





See procedure below


Procedure: 


Procedure: Removal of IUD under anesthesia.  Hysteroscopy did not end up being 

necessary.








Patient taken to the operating room and prepped and draped in the usual fashion.

 Single-tooth tenaculum applied to the anterior lip of the cervix.  Before 

starting with a hysteroscopy, a long Anupama was used to attempt removal of the 

IUD 1 last time.  Fortunately, the IUD was able to be grasped and removed in its

entirety with the Anupama clamp.  As result, the procedure was concluded without 

needing to do a hysteroscopy.  Single-tooth tenaculum removed.  Good hemostasis 

noted throughout.  Patient tolerated the procedure well.  All instrument and lap

counts were correct.  Patient taken to the recovery in stable condition.


Anesthesia: KIMBERLYA


Surgeon: SOTERO GARCIA


Estimated blood loss: minimal


Pathology: none


Condition: stable


Disposition: PACU

## 2020-11-10 NOTE — HISTORY AND PHYSICAL REPORT
History of Present Illness


Date of examination: 20


History of present illness: 





This is a 30-year-old with a retained Mirena IUD.  Patient is unhappy with the 

Mirena.  Ultrasound confirmed IUD is in the uterus.  Unfortunately was not able 

to be removed in the office.  As a result, patient requires a hysteroscopic IUD 

removal.





Past History


Past Medical History: other (anemia, migraines)


Past Surgical History: D&C


Social history: no significant social history





- Obstetrical History


: 6


Para: 4


Number of Living Children: 4





Medications and Allergies


                                    Allergies











Allergy/AdvReac Type Severity Reaction Status Date / Time


 


No Known Allergies Allergy   Verified 20 10:02











                                Home Medications











 Medication  Instructions  Recorded  Confirmed  Last Taken  Type


 


No Known Home Medications [No  20 Unknown History





Reported Home Medications]     














Review of Systems


All systems: negative (Except HPI)





- Physical Exam


Vulva: both: normal


Vagina: Positive: normal moisture.  Negative: discharge


Cervix: Negative: lesion, discharge





Results


All other labs normal.








Assessment and Plan





- Patient Problems


(1) Malpositioned IUD


Current Visit: No   Status: Acute   


Plan to address problem: 


Patient for hysteroscopic IUD removal.  Patient fully consented for the surgery.

 Risks, benefits, and alternatives were all discussed with the patient including

risk of bleeding, infection, and potential for injury.  Patient understands and 

accepts these risks.  Patient agrees to proceed with surgery.  All questions 

were answered.